# Patient Record
Sex: MALE | Race: WHITE | NOT HISPANIC OR LATINO | Employment: OTHER | URBAN - METROPOLITAN AREA
[De-identification: names, ages, dates, MRNs, and addresses within clinical notes are randomized per-mention and may not be internally consistent; named-entity substitution may affect disease eponyms.]

---

## 2024-09-04 ENCOUNTER — PROCEDURE VISIT (OUTPATIENT)
Dept: NEUROLOGY | Facility: CLINIC | Age: 69
End: 2024-09-04
Payer: MEDICARE

## 2024-09-04 VITALS
HEIGHT: 70 IN | HEART RATE: 55 BPM | DIASTOLIC BLOOD PRESSURE: 80 MMHG | OXYGEN SATURATION: 98 % | SYSTOLIC BLOOD PRESSURE: 124 MMHG | BODY MASS INDEX: 33 KG/M2

## 2024-09-04 DIAGNOSIS — R20.0 NUMBNESS: Primary | ICD-10-CM

## 2024-09-04 NOTE — PROGRESS NOTES
EMG and Nerve Conduction Studies      History: 69-year-old gentleman with right hand pain and numbness.      Results: Nerve conduction studies were performed on the right upper extremity.  Right median antidromic sensory nerve action potentials across the wrist segment were prolonged in latency and reduced in expected amplitude.  Right radial antidromic sensory nerve action potentials across the wrist segment were normal.  Right ulnar antidromic sensory nerve action potentials across the wrist segment were borderline in latency.  Right median compound motor action potentials were normal in latency but reduced in expected amplitude.  Right ulnar compound motor action potentials were normal throughout.  Right median F waves were borderline to prolonged in expected latency.  Right ulnar F waves were borderline in latency.    EMG needle examination of selected muscles of the right upper extremity revealed no abnormal insertional activity, spontaneous activity, or motor unit remodeling.  Please see accompanying data for details.    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    Jerrell Bronson M.D.              Dictated utilizing Dragon dictation.